# Patient Record
Sex: MALE | Race: OTHER | HISPANIC OR LATINO | URBAN - METROPOLITAN AREA
[De-identification: names, ages, dates, MRNs, and addresses within clinical notes are randomized per-mention and may not be internally consistent; named-entity substitution may affect disease eponyms.]

---

## 2022-12-01 ENCOUNTER — HOSPITAL ENCOUNTER (EMERGENCY)
Facility: OTHER | Age: 37
Discharge: HOME | End: 2022-12-01
Attending: EMERGENCY MEDICINE
Payer: COMMERCIAL

## 2022-12-01 VITALS
BODY MASS INDEX: 26.48 KG/M2 | HEIGHT: 70 IN | SYSTOLIC BLOOD PRESSURE: 142 MMHG | WEIGHT: 185 LBS | OXYGEN SATURATION: 100 % | TEMPERATURE: 99 F | HEART RATE: 73 BPM | DIASTOLIC BLOOD PRESSURE: 71 MMHG | RESPIRATION RATE: 16 BRPM

## 2022-12-01 DIAGNOSIS — S39.012A STRAIN OF LUMBAR REGION, INITIAL ENCOUNTER: Primary | ICD-10-CM

## 2022-12-01 LAB
HCV AB SERPL QL IA: NEGATIVE
HIV 1+2 AB+HIV1 P24 AG SERPL QL IA: NEGATIVE

## 2022-12-01 PROCEDURE — 99284 EMERGENCY DEPT VISIT MOD MDM: CPT

## 2022-12-01 PROCEDURE — 63600175 PHARM REV CODE 636 W HCPCS

## 2022-12-01 PROCEDURE — 25000003 PHARM REV CODE 250

## 2022-12-01 PROCEDURE — 96372 THER/PROPH/DIAG INJ SC/IM: CPT

## 2022-12-01 PROCEDURE — 86803 HEPATITIS C AB TEST: CPT

## 2022-12-01 PROCEDURE — 87389 HIV-1 AG W/HIV-1&-2 AB AG IA: CPT

## 2022-12-01 RX ORDER — IBUPROFEN 800 MG/1
800 TABLET ORAL EVERY 6 HOURS PRN
Qty: 20 TABLET | Refills: 0 | Status: SHIPPED | OUTPATIENT
Start: 2022-12-01

## 2022-12-01 RX ORDER — LIDOCAINE 50 MG/G
1 PATCH TOPICAL
Status: DISCONTINUED | OUTPATIENT
Start: 2022-12-01 | End: 2022-12-01 | Stop reason: HOSPADM

## 2022-12-01 RX ORDER — DEXAMETHASONE SODIUM PHOSPHATE 4 MG/ML
8 INJECTION, SOLUTION INTRA-ARTICULAR; INTRALESIONAL; INTRAMUSCULAR; INTRAVENOUS; SOFT TISSUE
Status: COMPLETED | OUTPATIENT
Start: 2022-12-01 | End: 2022-12-01

## 2022-12-01 RX ORDER — ORPHENADRINE CITRATE 100 MG/1
100 TABLET, EXTENDED RELEASE ORAL
Status: COMPLETED | OUTPATIENT
Start: 2022-12-01 | End: 2022-12-01

## 2022-12-01 RX ORDER — KETOROLAC TROMETHAMINE 30 MG/ML
30 INJECTION, SOLUTION INTRAMUSCULAR; INTRAVENOUS
Status: COMPLETED | OUTPATIENT
Start: 2022-12-01 | End: 2022-12-01

## 2022-12-01 RX ORDER — NAPROXEN 500 MG/1
500 TABLET ORAL
Status: DISCONTINUED | OUTPATIENT
Start: 2022-12-01 | End: 2022-12-01

## 2022-12-01 RX ORDER — TRIAMCINOLONE ACETONIDE 40 MG/ML
40 INJECTION, SUSPENSION INTRA-ARTICULAR; INTRAMUSCULAR
Status: DISCONTINUED | OUTPATIENT
Start: 2022-12-01 | End: 2022-12-01

## 2022-12-01 RX ORDER — ORPHENADRINE CITRATE 30 MG/ML
30 INJECTION INTRAMUSCULAR; INTRAVENOUS
Status: DISCONTINUED | OUTPATIENT
Start: 2022-12-01 | End: 2022-12-01

## 2022-12-01 RX ORDER — METHOCARBAMOL 750 MG/1
1500 TABLET, FILM COATED ORAL 4 TIMES DAILY
Qty: 80 TABLET | Refills: 0 | Status: SHIPPED | OUTPATIENT
Start: 2022-12-01 | End: 2022-12-11

## 2022-12-01 RX ADMIN — KETOROLAC TROMETHAMINE 30 MG: 30 INJECTION, SOLUTION INTRAMUSCULAR; INTRAVENOUS at 02:12

## 2022-12-01 RX ADMIN — ORPHENADRINE CITRATE 100 MG: 100 TABLET, EXTENDED RELEASE ORAL at 02:12

## 2022-12-01 RX ADMIN — LIDOCAINE 1 PATCH: 50 PATCH CUTANEOUS at 02:12

## 2022-12-01 RX ADMIN — DEXAMETHASONE SODIUM PHOSPHATE 8 MG: 4 INJECTION INTRA-ARTICULAR; INTRALESIONAL; INTRAMUSCULAR; INTRAVENOUS; SOFT TISSUE at 02:12

## 2022-12-01 NOTE — ED TRIAGE NOTES
Back pain that pt states started yesterday morning. Lower back pain radiating into the left buttocks and hip, tingling in the groin area. Pt states difficulty ambulating. Pt is alert and oriented, respirations are even unlabored.

## 2022-12-01 NOTE — ED PROVIDER NOTES
"Source of History:  Patient    Chief complaint:  Back Pain (Reports sever  lower back pain, numbness/tingling to L leg and groin. Denies incontinence, weakness, new trauma. Hx of herniated disc several years ago. Denies other med history. Took tylenol PTA)      HPI:  Jayjay Lerma is a 37 y.o. male presenting with lower back pain x2 days. Patient states that he helped left his brother's heavy suitcase into the car and woke up the next morning with significant pain to his lower back.  He describes the pain as sharp and radiates across his lower back and down his left buttock and leg. Patient reports 2 prior episodes of back pain, including herniated disc after an injury.  Aggravating factors include changing positions and walking.  He has been taking Tylenol without alleviation of symptoms. Denies fever, chills, bowel or bladder incontinence, weakness.    This is the extent to the patients complaints today here in the emergency department.    ROS: As per HPI and below:  Constitutional: No fever.  No chills.  Eyes: No visual changes.   ENT: No sore throat. No ear pain.  Urinary: No abnormal urination.  MSK: +lower back pain  Integument: No rashes or lesions.    Review of patient's allergies indicates:  No Known Allergies    PMH:  As per HPI and below:  Past Medical History:   Diagnosis Date    IBS (irritable bowel syndrome)      History reviewed. No pertinent surgical history.    Social History     Tobacco Use    Smoking status: Never    Smokeless tobacco: Never   Substance Use Topics    Alcohol use: Yes    Drug use: Yes     Types: Marijuana       Physical Exam:    BP (!) 142/71 (BP Location: Left arm, Patient Position: Sitting)   Pulse 73   Temp 98.9 °F (37.2 °C)   Resp 16   Ht 5' 10" (1.778 m)   Wt 83.9 kg (185 lb)   SpO2 100%   BMI 26.54 kg/m²   Nursing note and vital signs reviewed.  Constitutional: No acute distress.  Eyes: No conjunctival injection.  Extraocular muscles are intact.  ENT: Normal " phonation.  Musculoskeletal:  Lumbar midline tenderness to palpation.  No crepitus or step-offs.  Normal flexion and extension of lumbar spine.  Sensation intact.  Strength 5/5 to bilateral upper and lower extremities.  Skin: No rashes seen.  Good turgor.  No abrasions.  No ecchymoses.  Psych: Appropriate, conversant.  Neuro:  Cranial nerves III-XII intact.  No acute neurological deficits.      I decided to obtain the patient's medical records.    No results found for this or any previous visit.  Imaging Results              X-Ray Lumbar Spine Ap And Lateral (Final result)  Result time 12/01/22 13:10:15      Final result by Harley Salazar MD (12/01/22 13:10:15)                   Impression:      No evidence for acute fracture, bone destruction, or subluxation.      Electronically signed by: Harley Salazar MD  Date:    12/01/2022  Time:    13:10               Narrative:    EXAMINATION:  XR LUMBAR SPINE AP AND LATERAL    CLINICAL HISTORY:  LBP with radiating groin pain h/o disc herniation;    TECHNIQUE:  AP, lateral and spot images were performed of the lumbar spine.    COMPARISON:  None    FINDINGS:  Posterior vertebral alignment is satisfactory.  Vertebral body heights and disc spaces appear well maintained.  There is no evidence for acute fracture or bone destruction.  No abnormal paraspinal masses are identified.  Sacroiliac joints are unremarkable.                                      MDM:    Urgent evaluation of a 37-year-old male with back pain. Patient appears uncomfortable and is slow to change positions however patient ambulates normally and FROM, full flexion and extension of all extremities and back. Patient's pain is localized and reproducible with palpation of lumbar spine. No midline tenderness, step-offs or deformities of the cervical, thoracic or lumbar spine.  Patient neurovascularly intact, strength 5/5 equal bilaterally. Lungs CTA, regular heart rate and rhythm.  Will obtain imaging given injury,  treat pain and reassess.    Differential diagnosis includes but is not limited to:  Muscular pain, herniated disc, spine fracture, intra-abdominal causes and urinary tract infection, dehydration.      ED management  No red flags on presentation or physical exam. Unlikely to be spinal stenosis as there are no neurologic findings, does not appear to be infectious given no fever, no point tenderness, could be DJD or disc herniation but xray normal and no red flags that would prompt further imaging. Likely musculoskeletal pain. I will d/c home with anti-inflammatories and muscle relaxer. Will instruct pt to follow up with PCP in one week if symptoms do not improve. We disscused at length warning signs for return including but not limited to increased pain, change in gait, sensation changes around the rectum or perineum, bowel or bladder issues, fever and the pt understands. The pt is comfortable going home at this time. After taking into careful account the historical factors and physical exam findings of the patient's presentation today, in conjunction with the empirical and objective data obtained on ED workup, no acute emergent medical condition requiring admission has been identified. The patient appears to be low risk for an emergent medical condition and I feel it is safe and appropriate at this time for the patient to be discharged to follow-up as detailed in their discharge instructions for reevaluation and possible continued outpatient workup and management. I have discussed the specifics of the workup with the patient and the patient has verbalized understanding of the details of the workup, the diagnosis, the treatment plan, and the need for outpatient follow-up.  Although the patient has no emergent etiology today this does not preclude the development of an emergent condition so in addition, I have advised the patient that they can return to the ED and/or activate EMS at any time with worsening of their  symptoms, change of their symptoms, or with any other medical complaint.  The patient remained comfortable and stable during their visit in the ED.  Discharge and follow-up instructions discussed with the patient who expressed understanding and willingness to comply with my recommendations.       Diagnostic Impression:    1. Strain of lumbar region, initial encounter         ED Disposition Condition    Discharge Stable            ED Prescriptions       Medication Sig Dispense Start Date End Date Auth. Provider    methocarbamoL (ROBAXIN) 750 MG Tab Take 2 tablets (1,500 mg total) by mouth 4 (four) times daily. for 10 days 80 tablet 12/1/2022 12/11/2022 Devika Sanford PA-C    ibuprofen (ADVIL,MOTRIN) 800 MG tablet Take 1 tablet (800 mg total) by mouth every 6 (six) hours as needed for Pain. 20 tablet 12/1/2022 -- Devika Sanford PA-C          Follow-up Information    None         Please pardon typos or dictation errors, as this note was transcribed using M*Modal fluency direct dictation software.      Devika Sanford PA-C  12/01/22 [id number]

## 2022-12-01 NOTE — FIRST PROVIDER EVALUATION
Emergency Department TeleTriage Encounter Note      CHIEF COMPLAINT    Chief Complaint   Patient presents with    Back Pain     Reports sever  lower back pain, numbness/tingling to L leg and groin. Denies incontinence, weakness, new trauma. Hx of herniated disc several years ago. Denies other med history. Took tylenol PTA       VITAL SIGNS   Initial Vitals [12/01/22 1128]   BP Pulse Resp Temp SpO2   (!) 174/77 97 18 98.9 °F (37.2 °C) 100 %      MAP       --            ALLERGIES    Review of patient's allergies indicates:  No Known Allergies    PROVIDER TRIAGE NOTE  This is a teletriage evaluation of a 37 y.o. male presenting to the ED with c/o recurrent back injury- heavy lifting/twisting injury yesterday with severe spasms/pain L low back intermittent rad pain to L groin, L buttocks, tingling L toes. No weakness, saddle anesthesia or retention. No fever or IVDU. Has not seen spine specialist in past.     PE: no truncal ataxia. Non-toxic/well-appearing. No respiratory distress, speaks in full sentences without issue. No active emesis nor cough. Normal eye contact and mentation.     Plan: meds, XR, PVR. Further/augmented workup at discretion of examining provider.     All ED beds are full at present; patient notified of this status.  Patient seen and medically screened by [facility] via teletriage. Orders initiated at triage to expedite care.  Patient is stable and will be placed in an ED bed when available.  Care will be transferred to an alternate provider when patient has been placed in an Exam Room further exam, additional orders, and disposition.         ORDERS  Labs Reviewed - No data to display    ED Orders (720h ago, onward)      None              Virtual Visit Note: The provider triage portion of this emergency department evaluation and documentation was performed via [employer], a HIPAA-compliant telemedicine application, in concert with a tele-presenter in the room. A face to face patient evaluation with one  of my colleagues will occur once the patient is placed in an emergency department room.      DISCLAIMER: This note was prepared with [employer] voice recognition transcription software. Garbled syntax, mangled pronouns, and other bizarre constructions may be attributed to that software system.